# Patient Record
Sex: FEMALE | URBAN - METROPOLITAN AREA
[De-identification: names, ages, dates, MRNs, and addresses within clinical notes are randomized per-mention and may not be internally consistent; named-entity substitution may affect disease eponyms.]

---

## 2022-01-06 ENCOUNTER — APPOINTMENT (OUTPATIENT)
Dept: URBAN - METROPOLITAN AREA CLINIC 231 | Age: 62
Setting detail: DERMATOLOGY
End: 2022-01-06

## 2022-01-06 DIAGNOSIS — Z41.9 ENCOUNTER FOR PROCEDURE FOR PURPOSES OTHER THAN REMEDYING HEALTH STATE, UNSPECIFIED: ICD-10-CM

## 2022-01-06 PROCEDURE — OTHER CHEMICAL PEEL: OTHER

## 2022-01-06 PROCEDURE — OTHER COSMETIC CONSULTATION: RED SPOTS: OTHER

## 2022-01-06 PROCEDURE — OTHER COSMETIC CONSULTATION - RED SPOTS: OTHER

## 2022-01-06 ASSESSMENT — LOCATION ZONE DERM: LOCATION ZONE: FACE

## 2022-01-06 ASSESSMENT — LOCATION DETAILED DESCRIPTION DERM: LOCATION DETAILED: LEFT INFERIOR MEDIAL MALAR CHEEK

## 2022-01-06 ASSESSMENT — LOCATION SIMPLE DESCRIPTION DERM: LOCATION SIMPLE: LEFT CHEEK

## 2022-01-06 NOTE — PROCEDURE: CHEMICAL PEEL
Chemical Peel: Sal-Ex 20
Number Of Layers: 3
Post Peel Care: After the procedure, a post-peel cream was applied to the treated areas. Sun protection and post-care instructions were reviewed with the patient.
Price (Use Numbers Only, No Special Characters Or $): 400.00
Post-Care Instructions: I reviewed with the patient in detail post-care instructions. Patient should avoid sun exposure and wear sun protection.
Detail Level: Zone
Treatment Time (Optional): 5
Treatment Number: 1
Prep: The treated area was degreased with pre-peel cleanser.
Consent: Prior to the procedure, written consent was obtained and risks were reviewed, including but not limited to: redness, peeling, blistering, pigmentary change, scarring, infection, and pain.

## 2022-01-14 ENCOUNTER — APPOINTMENT (OUTPATIENT)
Dept: URBAN - METROPOLITAN AREA CLINIC 285 | Age: 62
Setting detail: DERMATOLOGY
End: 2022-01-14

## 2022-01-14 DIAGNOSIS — Z41.9 ENCOUNTER FOR PROCEDURE FOR PURPOSES OTHER THAN REMEDYING HEALTH STATE, UNSPECIFIED: ICD-10-CM

## 2022-01-14 PROCEDURE — OTHER COSMETIC QUOTE: OTHER

## 2022-01-14 NOTE — PROCEDURE: COSMETIC QUOTE
Botox Price Per Unit: 15
Notice: We have created a more complete Cosmetic Quote plan.  The procedure name is also Cosmetic Quote.  Please review the new plan and hide the Cosmetic Quote plan you do not want to use.
Discount Percentage: 0
Perlane Price Per Syringe: 500
Detail Level: Simple

## 2022-01-20 ENCOUNTER — APPOINTMENT (OUTPATIENT)
Dept: URBAN - METROPOLITAN AREA CLINIC 231 | Age: 62
Setting detail: DERMATOLOGY
End: 2022-01-20

## 2022-01-20 DIAGNOSIS — Z41.9 ENCOUNTER FOR PROCEDURE FOR PURPOSES OTHER THAN REMEDYING HEALTH STATE, UNSPECIFIED: ICD-10-CM

## 2022-01-20 PROCEDURE — OTHER LUMENIS M22: OTHER

## 2022-01-20 PROCEDURE — OTHER COSMETIC CONSULTATION: MICRONEEDLING: OTHER

## 2022-01-20 ASSESSMENT — LOCATION SIMPLE DESCRIPTION DERM: LOCATION SIMPLE: LEFT CHEEK

## 2022-01-20 ASSESSMENT — LOCATION DETAILED DESCRIPTION DERM: LOCATION DETAILED: LEFT INFERIOR CENTRAL MALAR CHEEK

## 2022-01-20 ASSESSMENT — LOCATION ZONE DERM: LOCATION ZONE: FACE

## 2022-01-20 NOTE — PROCEDURE: LUMENIS M22
Total Pulses: 109
Price (Use Numbers Only, No Special Characters Or $): 450.00
Number Of Passes: 1
Skin Type (Optional): II
External Cooling Fan Speed: 0
Fluence: 15
Pulse Duration (In Milliseconds): 4
Depth: medium
Anesthesia Type: 1% lidocaine with epinephrine
Pulse Delay (In Milliseconds): 20
Fluence Units: J/cm2
Treated Area: large area
Detail Level: Zone
Consent: Written consent obtained, risks reviewed including but not limited to crusting, scabbing, blistering, scarring, darker or lighter pigmentary change, bruising, and/or incomplete response.
Post-Care Instructions: I reviewed with the patient in detail post-care instructions. Patient should stay away from the sun and wear sun protection until treated areas are fully healed.
Procedure Text: The patient's skin was cleaned and the procedure was performed using the parameters noted above.

## 2022-01-28 ENCOUNTER — APPOINTMENT (OUTPATIENT)
Dept: URBAN - METROPOLITAN AREA CLINIC 231 | Age: 62
Setting detail: DERMATOLOGY
End: 2022-01-28

## 2022-01-28 DIAGNOSIS — Z41.9 ENCOUNTER FOR PROCEDURE FOR PURPOSES OTHER THAN REMEDYING HEALTH STATE, UNSPECIFIED: ICD-10-CM

## 2022-01-28 PROCEDURE — OTHER RF MICRONEEDLING: OTHER

## 2022-01-28 ASSESSMENT — LOCATION ZONE DERM: LOCATION ZONE: FACE

## 2022-01-28 ASSESSMENT — LOCATION SIMPLE DESCRIPTION DERM: LOCATION SIMPLE: LEFT CHEEK

## 2022-01-28 ASSESSMENT — LOCATION DETAILED DESCRIPTION DERM: LOCATION DETAILED: LEFT CENTRAL MALAR CHEEK

## 2022-01-28 NOTE — PROCEDURE: RF MICRONEEDLING
Location Override (Optional): upper lip/chin
Information: You must select either a Location or Location Override for the laser information to render in the note
Laser Override (Optional): Pixel-8
Depth In Mm: 1.5
Pulse Width In Msec (Optional): 300
Consent: Written consent obtained, risks reviewed including but not limited to pain, scarring, infection and incomplete improvement.  Patient understands the procedure is cosmetic in nature and will require out of pocket payment.
Rf Time In Msec (Optional): 190/250
Post-Care Instructions: After the procedure, take precautions agains sun exposure. Do not apply sunscreen for 12 hours after the procedure. Do not apply make-up for 12 hours after the procedure. Avoid alcohol based toners for 10-14 days. After 2-3 days patients can return to their regular skin regimen.
Level (Optional): 06
Indication: skin tightening and resurfacing
Depth In Mm: 1.7
Indication: enlarged pore size
Price (Use Numbers Only, No Special Characters Or $): 0712.49
Anesthesia Type: 1% lidocaine with epinephrine
Depth In Mm: 1.8
Treatment Number (Optional): 1
Laser: Elda Pixel8-
Pulse Count (Optional): 697
Depth In Mm: 1.1
Treatment Number (Optional): 0
Indication: moderate to severe periorbital rhytides
Location #1: Forehead
Location #2: Cheeks
Pre-Procedure Text: The treatment areas were cleansed in the usual fashion and treatment proceeded as outlined above.
Length Of Topical Anesthesia Application (Optional): 60 minutes
Detail Level: Simple
Location #3: Nose
Topical Anesthesia Type: BLT cream (benzocaine 20%, lidocaine 6%, tetracaine 4%)

## 2022-02-25 ENCOUNTER — APPOINTMENT (OUTPATIENT)
Dept: URBAN - METROPOLITAN AREA CLINIC 231 | Age: 62
Setting detail: DERMATOLOGY
End: 2022-02-25

## 2022-02-25 DIAGNOSIS — Z41.9 ENCOUNTER FOR PROCEDURE FOR PURPOSES OTHER THAN REMEDYING HEALTH STATE, UNSPECIFIED: ICD-10-CM

## 2022-02-25 PROCEDURE — OTHER RF MICRONEEDLING: OTHER

## 2022-02-25 ASSESSMENT — LOCATION SIMPLE DESCRIPTION DERM: LOCATION SIMPLE: LEFT CHEEK

## 2022-02-25 ASSESSMENT — LOCATION DETAILED DESCRIPTION DERM: LOCATION DETAILED: LEFT INFERIOR MEDIAL MALAR CHEEK

## 2022-02-25 ASSESSMENT — LOCATION ZONE DERM: LOCATION ZONE: FACE

## 2022-02-25 NOTE — HPI: COSMETIC (MICRONEEDLING)
Have You Had Microneedling Treatments Before?: has had a previous microneedling treatment
When Was Your Last Treatment?: 01/28/2022

## 2022-02-25 NOTE — PROCEDURE: RF MICRONEEDLING
Indication: moderate to severe perioral rhytides
Power In Goodman (Optional): 07
Level (Optional): 08
Location #2: Cheeks
Consent: Written consent obtained, risks reviewed including but not limited to pain, scarring, infection and incomplete improvement.  Patient understands the procedure is cosmetic in nature and will require out of pocket payment.
Laser: Elda Pixel8-
Pulse Width In Msec (Optional): 300
Treatment Number (Optional): 2
Depth In Mm: 1.7
Anesthesia Type: 1% lidocaine with epinephrine
Depth In Mm: 1.8
Pulse Count (Optional): 632
Information: You must select either a Location or Location Override for the laser information to render in the note
Indication: enlarged pore size
Depth In Mm: 1.2
Price (Use Numbers Only, No Special Characters Or $): 0.00
Laser Override (Optional): Pixel-8
Power In Goodman (Optional): 06
Location #4: Nose
Depth In Mm: 1.5
Post-Care Instructions: After the procedure, take precautions agains sun exposure. Do not apply sunscreen for 12 hours after the procedure. Do not apply make-up for 12 hours after the procedure. Avoid alcohol based toners for 10-14 days. After 2-3 days patients can return to their regular skin regimen.
Location #1: Forehead
Length Of Topical Anesthesia Application (Optional): 60 minutes
Indication: skin tightening and resurfacing
Location Override (Optional): upper lip/chin
Detail Level: Simple
Rf Time In Msec (Optional): 190/250
Topical Anesthesia Type: BLT cream (benzocaine 20%, lidocaine 6%, tetracaine 4%)
Treatment Number (Optional): 0
Pre-Procedure Text: The treatment areas were cleansed in the usual fashion and treatment proceeded as outlined above.

## 2022-03-18 ENCOUNTER — APPOINTMENT (OUTPATIENT)
Dept: URBAN - METROPOLITAN AREA CLINIC 231 | Age: 62
Setting detail: DERMATOLOGY
End: 2022-03-18

## 2022-03-18 DIAGNOSIS — Z41.9 ENCOUNTER FOR PROCEDURE FOR PURPOSES OTHER THAN REMEDYING HEALTH STATE, UNSPECIFIED: ICD-10-CM

## 2022-03-18 PROCEDURE — OTHER LUMENIS M22: OTHER

## 2022-03-18 ASSESSMENT — LOCATION DETAILED DESCRIPTION DERM: LOCATION DETAILED: LEFT INFERIOR CENTRAL MALAR CHEEK

## 2022-03-18 ASSESSMENT — LOCATION SIMPLE DESCRIPTION DERM: LOCATION SIMPLE: LEFT CHEEK

## 2022-03-18 ASSESSMENT — LOCATION ZONE DERM: LOCATION ZONE: FACE

## 2022-03-18 NOTE — PROCEDURE: LUMENIS M22
Post-Care Instructions: I reviewed with the patient in detail post-care instructions. Patient should stay away from the sun and wear sun protection until treated areas are fully healed.
Pulse Duration (In Milliseconds): 4
Consent: Written consent obtained, risks reviewed including but not limited to crusting, scabbing, blistering, scarring, darker or lighter pigmentary change, bruising, and/or incomplete response.
Fluence Units: J/cm2
Detail Level: Zone
Anesthesia Type: 1% lidocaine with epinephrine
Price (Use Numbers Only, No Special Characters Or $): 0.00
Number Of Passes: 1
Procedure Text: The patient's skin was cleaned and the procedure was performed using the parameters noted above.
Treated Area: large area
Fluence: 15
Skin Type (Optional): II
External Cooling Fan Speed: 0
Depth: medium
Treatment Number: 2
Total Pulses: 123
Pulse Delay (In Milliseconds): 20

## 2022-04-08 ENCOUNTER — APPOINTMENT (OUTPATIENT)
Dept: URBAN - METROPOLITAN AREA CLINIC 231 | Age: 62
Setting detail: DERMATOLOGY
End: 2022-04-08

## 2022-04-08 DIAGNOSIS — Z41.9 ENCOUNTER FOR PROCEDURE FOR PURPOSES OTHER THAN REMEDYING HEALTH STATE, UNSPECIFIED: ICD-10-CM

## 2022-04-08 PROCEDURE — OTHER CHEMICAL PEEL: OTHER

## 2022-04-08 ASSESSMENT — LOCATION DETAILED DESCRIPTION DERM: LOCATION DETAILED: LEFT MEDIAL MALAR CHEEK

## 2022-04-08 ASSESSMENT — LOCATION SIMPLE DESCRIPTION DERM: LOCATION SIMPLE: LEFT CHEEK

## 2022-04-08 ASSESSMENT — LOCATION ZONE DERM: LOCATION ZONE: FACE

## 2022-04-08 NOTE — PROCEDURE: CHEMICAL PEEL
Number Of Layers: 3
Treatment Time (Optional): 5
Post Peel Care: After the procedure, a post-peel cream was applied to the treated areas. Sun protection and post-care instructions were reviewed with the patient.
Price (Use Numbers Only, No Special Characters Or $): 0.00
Treatment Number: 2
Chemical Peel: Sal-Ex 30
Post-Care Instructions: I reviewed with the patient in detail post-care instructions. Patient should avoid sun exposure and wear sun protection.
Prep: The treated area was degreased with pre-peel cleanser.
Detail Level: Zone
Consent: Prior to the procedure, written consent was obtained and risks were reviewed, including but not limited to: redness, peeling, blistering, pigmentary change, scarring, infection, and pain.

## 2022-04-08 NOTE — HPI: COSMETIC (CHEMICAL PEEL)
Have You Had A Chemical Peel Before?: has had a previous peel
When Outside In The Sun, Do You...: mostly burns, rarely tans
When Was Your Last Peel?: 01/06/2022

## 2022-05-12 ENCOUNTER — APPOINTMENT (OUTPATIENT)
Dept: URBAN - METROPOLITAN AREA CLINIC 231 | Age: 62
Setting detail: DERMATOLOGY
End: 2022-05-13

## 2022-05-12 DIAGNOSIS — Z41.9 ENCOUNTER FOR PROCEDURE FOR PURPOSES OTHER THAN REMEDYING HEALTH STATE, UNSPECIFIED: ICD-10-CM

## 2022-05-12 PROCEDURE — OTHER CHEMICAL PEEL: OTHER

## 2022-05-12 ASSESSMENT — LOCATION SIMPLE DESCRIPTION DERM: LOCATION SIMPLE: LEFT CHEEK

## 2022-05-12 ASSESSMENT — LOCATION DETAILED DESCRIPTION DERM: LOCATION DETAILED: LEFT MEDIAL MALAR CHEEK

## 2022-05-12 ASSESSMENT — LOCATION ZONE DERM: LOCATION ZONE: FACE

## 2022-05-12 NOTE — PROCEDURE: CHEMICAL PEEL
Treatment Time (Optional): 5
Post-Care Instructions: I reviewed with the patient in detail post-care instructions. Patient should avoid sun exposure and wear sun protection.
Consent: Prior to the procedure, written consent was obtained and risks were reviewed, including but not limited to: redness, peeling, blistering, pigmentary change, scarring, infection, and pain.
Chemical Peel: Sal-Ex 20
Number Of Layers: 3
Post Peel Care: After the procedure, a post-peel cream was applied to the treated areas. Sun protection and post-care instructions were reviewed with the patient.
Prep: The treated area was degreased with pre-peel cleanser.
Price (Use Numbers Only, No Special Characters Or $): 0.00
Detail Level: Zone

## 2022-05-12 NOTE — HPI: COSMETIC (CHEMICAL PEEL)
Have You Had A Chemical Peel Before?: has had a previous peel
When Outside In The Sun, Do You...: mostly burns, rarely tans
When Was Your Last Peel?: 04/08/2022

## 2022-07-21 ENCOUNTER — APPOINTMENT (OUTPATIENT)
Dept: URBAN - METROPOLITAN AREA CLINIC 231 | Age: 62
Setting detail: DERMATOLOGY
End: 2022-07-21

## 2022-07-21 DIAGNOSIS — D18.0 HEMANGIOMA: ICD-10-CM

## 2022-07-21 DIAGNOSIS — L82.1 OTHER SEBORRHEIC KERATOSIS: ICD-10-CM

## 2022-07-21 DIAGNOSIS — D22 MELANOCYTIC NEVI: ICD-10-CM

## 2022-07-21 DIAGNOSIS — Z12.83 ENCOUNTER FOR SCREENING FOR MALIGNANT NEOPLASM OF SKIN: ICD-10-CM

## 2022-07-21 DIAGNOSIS — L81.4 OTHER MELANIN HYPERPIGMENTATION: ICD-10-CM

## 2022-07-21 PROBLEM — D18.01 HEMANGIOMA OF SKIN AND SUBCUTANEOUS TISSUE: Status: ACTIVE | Noted: 2022-07-21

## 2022-07-21 PROBLEM — D22.5 MELANOCYTIC NEVI OF TRUNK: Status: ACTIVE | Noted: 2022-07-21

## 2022-07-21 PROCEDURE — OTHER COUNSELING: OTHER

## 2022-07-21 PROCEDURE — OTHER MIPS QUALITY: OTHER

## 2022-07-21 PROCEDURE — 99213 OFFICE O/P EST LOW 20 MIN: CPT

## 2022-07-21 PROCEDURE — OTHER REASSURANCE: OTHER

## 2022-07-21 ASSESSMENT — LOCATION DETAILED DESCRIPTION DERM
LOCATION DETAILED: SUPERIOR THORACIC SPINE
LOCATION DETAILED: INFERIOR THORACIC SPINE

## 2022-07-21 ASSESSMENT — LOCATION ZONE DERM: LOCATION ZONE: TRUNK

## 2022-07-21 ASSESSMENT — LOCATION SIMPLE DESCRIPTION DERM: LOCATION SIMPLE: UPPER BACK

## 2023-06-15 ENCOUNTER — APPOINTMENT (OUTPATIENT)
Dept: URBAN - METROPOLITAN AREA CLINIC 231 | Age: 63
Setting detail: DERMATOLOGY
End: 2023-06-15

## 2023-06-15 DIAGNOSIS — Z41.9 ENCOUNTER FOR PROCEDURE FOR PURPOSES OTHER THAN REMEDYING HEALTH STATE, UNSPECIFIED: ICD-10-CM

## 2023-06-15 PROCEDURE — OTHER ADDITIONAL NOTES: OTHER

## 2023-06-15 PROCEDURE — OTHER FILLERS: OTHER

## 2023-06-15 ASSESSMENT — LOCATION DETAILED DESCRIPTION DERM
LOCATION DETAILED: RIGHT SUPERIOR LATERAL MALAR CHEEK
LOCATION DETAILED: LEFT INFERIOR LATERAL MALAR CHEEK
LOCATION DETAILED: LEFT CENTRAL MALAR CHEEK
LOCATION DETAILED: RIGHT INFERIOR LATERAL MALAR CHEEK
LOCATION DETAILED: LEFT SUPERIOR LATERAL MALAR CHEEK
LOCATION DETAILED: RIGHT CENTRAL MALAR CHEEK

## 2023-06-15 ASSESSMENT — LOCATION SIMPLE DESCRIPTION DERM
LOCATION SIMPLE: RIGHT CHEEK
LOCATION SIMPLE: LEFT CHEEK

## 2023-06-15 ASSESSMENT — LOCATION ZONE DERM: LOCATION ZONE: FACE

## 2023-06-15 NOTE — PROCEDURE: FILLERS
Include Cannula Information In Note?: No
Lateral Face Filler  Volume In Cc: 0
Consent: Written consent obtained. Risks include but not limited to bruising, beading, irregular texture, ulceration, infection, allergic reaction, scar formation, incomplete augmentation, temporary nature, procedural pain.
Detail Level: Detailed
Post-Care Instructions: Patient instructed to apply ice to reduce swelling.
Number Of Syringes (Required For Inventory): 1
Show Inventory Tab: Show
Filler: RHA 4
Include Cannula Information In Note?: Yes
Include Cannula Brand?: DermaSculpt
Map Statment: See Attach Map for Complete Details
Anesthesia Type: 1% lidocaine with epinephrine
Include Cannula Size?: 25G
Anesthesia Volume In Cc: 0.5
Filler: RHA 3
Include Cannula Length?: 1.5 inch
Additional Anesthesia Volume In Cc: 6
Topical Anesthesia?: BLT cream (benzocaine 8%, lidocaine 5%, tetracaine 4%)
Inventory Information: This plan will send filler information to inventory based on the fillers you select. Multiple fillers can be sent but you must ensure you select the appropriate fillers in the inventory tab.

## 2023-06-29 ENCOUNTER — APPOINTMENT (OUTPATIENT)
Dept: URBAN - METROPOLITAN AREA CLINIC 231 | Age: 63
Setting detail: DERMATOLOGY
End: 2023-06-29

## 2023-06-29 DIAGNOSIS — Z41.9 ENCOUNTER FOR PROCEDURE FOR PURPOSES OTHER THAN REMEDYING HEALTH STATE, UNSPECIFIED: ICD-10-CM

## 2023-06-29 PROCEDURE — OTHER FILLERS: OTHER

## 2023-06-29 ASSESSMENT — LOCATION ZONE DERM: LOCATION ZONE: FACE

## 2023-06-29 ASSESSMENT — LOCATION DETAILED DESCRIPTION DERM
LOCATION DETAILED: LEFT INFERIOR MEDIAL MALAR CHEEK
LOCATION DETAILED: RIGHT INFERIOR MEDIAL MALAR CHEEK

## 2023-06-29 ASSESSMENT — LOCATION SIMPLE DESCRIPTION DERM
LOCATION SIMPLE: RIGHT CHEEK
LOCATION SIMPLE: LEFT CHEEK

## 2023-06-29 NOTE — PROCEDURE: FILLERS
Nasolabial Folds Filler Volume In Cc: 0
Detail Level: Detailed
Include Cannula Information In Note?: No
Filler: Versa
Show Inventory Tab: Show
Number Of Syringes (Required For Inventory): 1
Map Statment: See Attach Map for Complete Details
Consent: Written consent obtained. Risks include but not limited to bruising, beading, irregular texture, ulceration, infection, allergic reaction, scar formation, incomplete augmentation, temporary nature, procedural pain.
Nasolabial Folds Filler Volume In Cc: 1.2
Post-Care Instructions: Patient instructed to apply ice to reduce swelling.
Anesthesia Type: 1% lidocaine with epinephrine
Anesthesia Volume In Cc: 0.5
Additional Anesthesia Volume In Cc: 6
Inventory Information: This plan will send filler information to inventory based on the fillers you select. Multiple fillers can be sent but you must ensure you select the appropriate fillers in the inventory tab.

## 2023-07-26 ENCOUNTER — APPOINTMENT (OUTPATIENT)
Dept: URBAN - METROPOLITAN AREA CLINIC 231 | Age: 63
Setting detail: DERMATOLOGY
End: 2023-07-26

## 2023-08-03 ENCOUNTER — APPOINTMENT (OUTPATIENT)
Dept: URBAN - METROPOLITAN AREA CLINIC 231 | Age: 63
Setting detail: DERMATOLOGY
End: 2023-08-03

## 2023-08-03 DIAGNOSIS — Z41.9 ENCOUNTER FOR PROCEDURE FOR PURPOSES OTHER THAN REMEDYING HEALTH STATE, UNSPECIFIED: ICD-10-CM

## 2023-08-03 PROCEDURE — OTHER ADDITIONAL NOTES: OTHER

## 2023-08-03 PROCEDURE — OTHER FILLERS: OTHER

## 2023-08-03 ASSESSMENT — LOCATION DETAILED DESCRIPTION DERM
LOCATION DETAILED: LEFT UPPER CUTANEOUS LIP
LOCATION DETAILED: RIGHT UPPER CUTANEOUS LIP
LOCATION DETAILED: RIGHT LOWER CUTANEOUS LIP
LOCATION DETAILED: LEFT SUPERIOR VERMILION LIP
LOCATION DETAILED: RIGHT INFERIOR VERMILION LIP
LOCATION DETAILED: LEFT LOWER CUTANEOUS LIP

## 2023-08-03 ASSESSMENT — LOCATION SIMPLE DESCRIPTION DERM
LOCATION SIMPLE: LEFT LIP
LOCATION SIMPLE: RIGHT LIP

## 2023-08-03 ASSESSMENT — LOCATION ZONE DERM: LOCATION ZONE: LIP

## 2023-08-03 NOTE — PROCEDURE: FILLERS
Additional Area 2 Volume In Cc: 0
Additional Anesthesia Volume In Cc: 6
Number Of Syringes (Required For Inventory): 1
Include Cannula Information In Note?: No
Filler: Juvederm Volbella XC
Inventory Information: This plan will send filler information to inventory based on the fillers you select. Multiple fillers can be sent but you must ensure you select the appropriate fillers in the inventory tab.
Detail Level: Detailed
Post-Care Instructions: Patient instructed to apply ice to reduce swelling.
Consent: Written consent obtained. Risks include but not limited to bruising, beading, irregular texture, ulceration, infection, allergic reaction, scar formation, incomplete augmentation, temporary nature, procedural pain.
Show Inventory Tab: Show
Map Statment: See Attach Map for Complete Details
Anesthesia Type: 1% lidocaine with epinephrine
Anesthesia Volume In Cc: 0.5

## 2023-08-03 NOTE — PROCEDURE: ADDITIONAL NOTES
Additional Notes: August filler special
Detail Level: Simple
Render Risk Assessment In Note?: no
Additional Notes: Discussed doing the chin next time for filler.

## 2023-11-22 ENCOUNTER — APPOINTMENT (OUTPATIENT)
Dept: URBAN - METROPOLITAN AREA CLINIC 231 | Age: 63
Setting detail: DERMATOLOGY
End: 2023-11-22

## 2023-11-22 DIAGNOSIS — L57.8 OTHER SKIN CHANGES DUE TO CHRONIC EXPOSURE TO NONIONIZING RADIATION: ICD-10-CM

## 2023-11-22 DIAGNOSIS — Z12.83 ENCOUNTER FOR SCREENING FOR MALIGNANT NEOPLASM OF SKIN: ICD-10-CM

## 2023-11-22 DIAGNOSIS — L20.89 OTHER ATOPIC DERMATITIS: ICD-10-CM

## 2023-11-22 PROBLEM — D23.5 OTHER BENIGN NEOPLASM OF SKIN OF TRUNK: Status: ACTIVE | Noted: 2023-11-22

## 2023-11-22 PROCEDURE — OTHER PRESCRIPTION: OTHER

## 2023-11-22 PROCEDURE — 99213 OFFICE O/P EST LOW 20 MIN: CPT

## 2023-11-22 PROCEDURE — OTHER MIPS QUALITY: OTHER

## 2023-11-22 PROCEDURE — OTHER TREATMENT REGIMEN: OTHER

## 2023-11-22 PROCEDURE — OTHER COUNSELING: OTHER

## 2023-11-22 RX ORDER — HYDROCORTISONE 25 MG/G
CREAM TOPICAL
Qty: 30 | Refills: 0 | Status: ERX | COMMUNITY
Start: 2023-11-22

## 2023-11-22 ASSESSMENT — LOCATION SIMPLE DESCRIPTION DERM
LOCATION SIMPLE: NOSE
LOCATION SIMPLE: RIGHT FOREHEAD

## 2023-11-22 ASSESSMENT — LOCATION ZONE DERM
LOCATION ZONE: NOSE
LOCATION ZONE: FACE

## 2023-11-22 ASSESSMENT — LOCATION DETAILED DESCRIPTION DERM
LOCATION DETAILED: NASAL TIP
LOCATION DETAILED: RIGHT INFERIOR MEDIAL FOREHEAD

## 2023-11-22 NOTE — PROCEDURE: TREATMENT REGIMEN
Detail Level: Zone
Initiate Treatment: Print\\nhydrocortisone 2.5 % topical cream	Apply to the affected areas on the nose once a day for up too two weeks then discontinue
Otc Regimen: Vanicream

## 2024-02-14 ENCOUNTER — APPOINTMENT (OUTPATIENT)
Dept: URBAN - METROPOLITAN AREA CLINIC 231 | Age: 64
Setting detail: DERMATOLOGY
End: 2024-02-15

## 2024-02-14 DIAGNOSIS — L71.8 OTHER ROSACEA: ICD-10-CM

## 2024-02-14 DIAGNOSIS — Z41.9 ENCOUNTER FOR PROCEDURE FOR PURPOSES OTHER THAN REMEDYING HEALTH STATE, UNSPECIFIED: ICD-10-CM

## 2024-02-14 PROCEDURE — OTHER COSMETIC CONSULTATION: BOTOX: OTHER

## 2024-02-14 PROCEDURE — OTHER PRESCRIPTION: OTHER

## 2024-02-14 PROCEDURE — OTHER TREATMENT REGIMEN: OTHER

## 2024-02-14 PROCEDURE — OTHER FILLERS: OTHER

## 2024-02-14 PROCEDURE — OTHER COUNSELING: OTHER

## 2024-02-14 PROCEDURE — 99213 OFFICE O/P EST LOW 20 MIN: CPT

## 2024-02-14 PROCEDURE — OTHER BOTOX: OTHER

## 2024-02-14 RX ORDER — METRONIDAZOLE 7.5 MG/G
CREAM TOPICAL
Qty: 45 | Refills: 2 | Status: ERX | COMMUNITY
Start: 2024-02-14

## 2024-02-14 ASSESSMENT — LOCATION SIMPLE DESCRIPTION DERM
LOCATION SIMPLE: LEFT FOREHEAD
LOCATION SIMPLE: LEFT CHEEK
LOCATION SIMPLE: LEFT CHEEK
LOCATION SIMPLE: RIGHT LIP
LOCATION SIMPLE: RIGHT CHEEK
LOCATION SIMPLE: GLABELLA
LOCATION SIMPLE: RIGHT EYEBROW
LOCATION SIMPLE: LEFT LIP

## 2024-02-14 ASSESSMENT — LOCATION DETAILED DESCRIPTION DERM
LOCATION DETAILED: LEFT CENTRAL BUCCAL CHEEK
LOCATION DETAILED: RIGHT LOWER CUTANEOUS LIP
LOCATION DETAILED: LEFT INFERIOR MEDIAL FOREHEAD
LOCATION DETAILED: RIGHT CENTRAL EYEBROW
LOCATION DETAILED: LEFT INFERIOR CENTRAL MALAR CHEEK
LOCATION DETAILED: LEFT LOWER CUTANEOUS LIP
LOCATION DETAILED: RIGHT CENTRAL BUCCAL CHEEK
LOCATION DETAILED: GLABELLA

## 2024-02-14 ASSESSMENT — LOCATION ZONE DERM
LOCATION ZONE: FACE
LOCATION ZONE: FACE
LOCATION ZONE: LIP

## 2024-02-14 NOTE — PROCEDURE: FILLERS
Tear Troughs Filler Volume In Cc: 0
Additional Anesthesia Volume In Cc: 6
Number Of Syringes (Required For Inventory): 1
Additional Area 1 Location: [facility]
Include Cannula Size?: 25G
Include Cannula Information In Note?: No
Consent: Written consent obtained. Risks include but not limited to bruising, beading, irregular texture, ulceration, infection, allergic reaction, scar formation, incomplete augmentation, temporary nature, procedural pain.
Post-Care Instructions: Patient instructed to apply ice to reduce swelling.
Filler: Juvederm Ultra Plus XC
Include Cannula Length?: 1.5 inch
Map Statment: See Attach Map for Complete Details
Anesthesia Type: 1% lidocaine with epinephrine
Inventory Information: This plan will send filler information to inventory based on the fillers you select. Multiple fillers can be sent but you must ensure you select the appropriate fillers in the inventory tab.
Detail Level: Detailed
Anesthesia Volume In Cc: 0.5
Include Cannula Information In Note?: Yes
Include Cannula Brand?: DermaSculpt
Show Inventory Tab: Show

## 2024-02-14 NOTE — PROCEDURE: BOTOX
Additional Area 6 Units: 0
Show Lcl Units: No
Show Glabellar Units: Yes
Consent: Written consent obtained. Risks include but not limited to lid/brow ptosis, bruising, swelling, diplopia, temporary effect, incomplete chemical denervation.
Post-Care Instructions: Patient instructed to not lie down for 4 hours and limit physical activity for 24 hours.
Incrementing Botox Units: By 0.5 Units
Dilution (U/0.1 Cc): 4
Detail Level: Detailed
Glabellar Complex Units: 30
Show Inventory Tab: Show

## 2024-02-14 NOTE — PROCEDURE: TREATMENT REGIMEN
Detail Level: Zone
Otc Regimen: Neutrogena Hydroboost cleanser\\nVanicream HA moisturizing cream
Initiate Treatment: metronidazole 0.75 % topical cream	Apply a thin layer to central face twice daily.

## 2024-06-26 ENCOUNTER — APPOINTMENT (OUTPATIENT)
Dept: URBAN - METROPOLITAN AREA CLINIC 231 | Age: 64
Setting detail: DERMATOLOGY
End: 2024-06-26

## 2024-06-26 DIAGNOSIS — Z41.9 ENCOUNTER FOR PROCEDURE FOR PURPOSES OTHER THAN REMEDYING HEALTH STATE, UNSPECIFIED: ICD-10-CM

## 2024-06-26 PROCEDURE — OTHER ADDITIONAL NOTES: OTHER

## 2024-06-26 PROCEDURE — OTHER FILLERS: OTHER

## 2024-06-26 PROCEDURE — OTHER BOTOX: OTHER

## 2024-06-26 ASSESSMENT — LOCATION SIMPLE DESCRIPTION DERM
LOCATION SIMPLE: RIGHT FOREHEAD
LOCATION SIMPLE: LEFT CHEEK
LOCATION SIMPLE: GLABELLA
LOCATION SIMPLE: RIGHT EYEBROW
LOCATION SIMPLE: CHIN
LOCATION SIMPLE: RIGHT CHEEK
LOCATION SIMPLE: LEFT FOREHEAD

## 2024-06-26 ASSESSMENT — LOCATION DETAILED DESCRIPTION DERM
LOCATION DETAILED: LEFT INFERIOR CENTRAL MALAR CHEEK
LOCATION DETAILED: RIGHT INFERIOR CENTRAL MALAR CHEEK
LOCATION DETAILED: RIGHT INFERIOR FOREHEAD
LOCATION DETAILED: RIGHT MEDIAL EYEBROW
LOCATION DETAILED: GLABELLA
LOCATION DETAILED: LEFT INFERIOR MEDIAL FOREHEAD
LOCATION DETAILED: LEFT LATERAL MALAR CHEEK
LOCATION DETAILED: LEFT CHIN
LOCATION DETAILED: RIGHT CHIN
LOCATION DETAILED: RIGHT CENTRAL MALAR CHEEK

## 2024-06-26 ASSESSMENT — LOCATION ZONE DERM: LOCATION ZONE: FACE

## 2024-06-26 NOTE — PROCEDURE: BOTOX
R Brow Units: 0
Show Lateral Platysmal Band Units: Yes
Show Ucl Units: No
Consent: Written consent obtained. Risks include but not limited to lid/brow ptosis, bruising, swelling, diplopia, temporary effect, incomplete chemical denervation.
Additional Area 1 Location: chin
Dilution (U/0.1 Cc): 4
Post-Care Instructions: Patient instructed to not lie down for 4 hours and limit physical activity for 24 hours.
Additional Area 1 Units: 6
Incrementing Botox Units: By 0.5 Units
Detail Level: Detailed
Glabellar Complex Units: 30
Show Inventory Tab: Show

## 2024-06-26 NOTE — PROCEDURE: FILLERS
Decollete Filler Volume In Cc: 0
Anesthesia Type: 1% lidocaine with epinephrine
Number Of Syringes (Required For Inventory): 1
Anesthesia Volume In Cc: 0.5
Include Cannula Information In Note?: No
Additional Anesthesia Volume In Cc: 6
Inventory Information: This plan will send filler information to inventory based on the fillers you select. Multiple fillers can be sent but you must ensure you select the appropriate fillers in the inventory tab.
Additional Area 1 Location: chin
Detail Level: Detailed
Additional Area 1 Volume In Cc: 0.2
Show Inventory Tab: Show
Filler: Juvederm Ultra Plus XC
Consent: Written consent obtained. Risks include but not limited to bruising, beading, irregular texture, ulceration, infection, allergic reaction, scar formation, incomplete augmentation, temporary nature, procedural pain.
Cheeks Filler Volume In Cc: 0.8
Post-Care Instructions: Patient instructed to apply ice to reduce swelling.
Map Statment: See Attach Map for Complete Details

## 2024-11-19 NOTE — PROCEDURE: ADDITIONAL NOTES
Additional Notes: Discussed 1 syringe versa for n/l folds and chin. \\n35 units of Botox (30 u to glabella and 5 u for eyebrow lift)\\n1 syringe volbella to vermillion of lips at separate future appointment. Patient requests to be done in stages.
Render Risk Assessment In Note?: no
Detail Level: Simple
Calm

## 2025-03-12 ENCOUNTER — APPOINTMENT (OUTPATIENT)
Dept: URBAN - METROPOLITAN AREA CLINIC 231 | Age: 65
Setting detail: DERMATOLOGY
End: 2025-03-13

## 2025-03-12 DIAGNOSIS — Z41.9 ENCOUNTER FOR PROCEDURE FOR PURPOSES OTHER THAN REMEDYING HEALTH STATE, UNSPECIFIED: ICD-10-CM

## 2025-03-12 PROCEDURE — OTHER ADDITIONAL NOTES: OTHER

## 2025-03-12 PROCEDURE — OTHER FILLERS: OTHER

## 2025-03-12 ASSESSMENT — LOCATION ZONE DERM: LOCATION ZONE: FACE

## 2025-03-12 ASSESSMENT — LOCATION DETAILED DESCRIPTION DERM
LOCATION DETAILED: RIGHT CENTRAL MALAR CHEEK
LOCATION DETAILED: RIGHT INFERIOR CENTRAL MALAR CHEEK
LOCATION DETAILED: LEFT CHIN
LOCATION DETAILED: LEFT INFERIOR CENTRAL MALAR CHEEK
LOCATION DETAILED: LEFT LATERAL MALAR CHEEK

## 2025-03-12 ASSESSMENT — LOCATION SIMPLE DESCRIPTION DERM
LOCATION SIMPLE: CHIN
LOCATION SIMPLE: LEFT CHEEK
LOCATION SIMPLE: RIGHT CHEEK

## 2025-03-12 NOTE — PROCEDURE: ADDITIONAL NOTES
Additional Notes: Consider skinvive sample for chin and perioral region at time of Volbella. 
Detail Level: Simple
Render Risk Assessment In Note?: no

## 2025-04-16 ENCOUNTER — APPOINTMENT (OUTPATIENT)
Dept: URBAN - METROPOLITAN AREA CLINIC 231 | Age: 65
Setting detail: DERMATOLOGY
End: 2025-04-17

## 2025-04-16 DIAGNOSIS — Z41.9 ENCOUNTER FOR PROCEDURE FOR PURPOSES OTHER THAN REMEDYING HEALTH STATE, UNSPECIFIED: ICD-10-CM

## 2025-04-16 PROCEDURE — OTHER CHEMICAL PEEL: OTHER

## 2025-04-16 ASSESSMENT — LOCATION ZONE DERM: LOCATION ZONE: FACE

## 2025-04-16 ASSESSMENT — LOCATION SIMPLE DESCRIPTION DERM: LOCATION SIMPLE: LEFT CHEEK

## 2025-04-16 ASSESSMENT — LOCATION DETAILED DESCRIPTION DERM: LOCATION DETAILED: LEFT MEDIAL MALAR CHEEK

## 2025-04-16 NOTE — HPI: COSMETIC (CHEMICAL PEEL)
previous_has_had_a_previous_peel
When Outside In The Sun, Do You...: mostly burns, rarely tans
When Was Your Last Peel?: 05/12/2022

## 2025-04-16 NOTE — PROCEDURE: CHEMICAL PEEL
Price (Use Numbers Only, No Special Characters Or $): 0.00
Post Peel Care: After the procedure, Vitamin C/E ferulic serum, EltaMD SPF 46 was applied to the treated areas and post-care instructions were reviewed with the patient.
Detail Level: Zone
Consent: Prior to the procedure, written consent was obtained and risks were reviewed, including but not limited to: redness, peeling, blistering, pigmentary change, scarring, infection, and pain.
Number Of Layers: 3
Chemical Peel: GA 20%
Treatment Time (Optional): 5 minutes
Treatment Number: 0
Post-Care Instructions: I reviewed with the patient in detail post-care instructions. Patient should avoid sun exposure and wear sun protection.
Prep: The treated area was degreased with pre-peel cleanser, and vaseline was applied for protection of mucous membranes.

## 2025-08-13 ENCOUNTER — APPOINTMENT (OUTPATIENT)
Dept: URBAN - METROPOLITAN AREA CLINIC 231 | Age: 65
Setting detail: DERMATOLOGY
End: 2025-08-14

## 2025-08-13 DIAGNOSIS — L92.3 FOREIGN BODY GRANULOMA OF THE SKIN AND SUBCUTANEOUS TISSUE: ICD-10-CM

## 2025-08-13 DIAGNOSIS — D22 MELANOCYTIC NEVI: ICD-10-CM

## 2025-08-13 DIAGNOSIS — L57.8 OTHER SKIN CHANGES DUE TO CHRONIC EXPOSURE TO NONIONIZING RADIATION: ICD-10-CM

## 2025-08-13 DIAGNOSIS — L82.1 OTHER SEBORRHEIC KERATOSIS: ICD-10-CM

## 2025-08-13 DIAGNOSIS — D18.0 HEMANGIOMA: ICD-10-CM

## 2025-08-13 DIAGNOSIS — Z12.83 ENCOUNTER FOR SCREENING FOR MALIGNANT NEOPLASM OF SKIN: ICD-10-CM

## 2025-08-13 PROBLEM — D22.5 MELANOCYTIC NEVI OF TRUNK: Status: ACTIVE | Noted: 2025-08-13

## 2025-08-13 PROBLEM — D23.71 OTHER BENIGN NEOPLASM OF SKIN OF RIGHT LOWER LIMB, INCLUDING HIP: Status: ACTIVE | Noted: 2025-08-13

## 2025-08-13 PROBLEM — D18.01 HEMANGIOMA OF SKIN AND SUBCUTANEOUS TISSUE: Status: ACTIVE | Noted: 2025-08-13

## 2025-08-13 PROCEDURE — OTHER ADDITIONAL NOTES: OTHER

## 2025-08-13 PROCEDURE — OTHER COUNSELING: OTHER

## 2025-08-13 PROCEDURE — 99213 OFFICE O/P EST LOW 20 MIN: CPT

## 2025-08-13 ASSESSMENT — LOCATION ZONE DERM
LOCATION ZONE: TRUNK
LOCATION ZONE: FACE
LOCATION ZONE: FINGER

## 2025-08-13 ASSESSMENT — LOCATION SIMPLE DESCRIPTION DERM
LOCATION SIMPLE: RIGHT FOREHEAD
LOCATION SIMPLE: RIGHT UPPER BACK
LOCATION SIMPLE: RIGHT INDEX FINGER
LOCATION SIMPLE: LEFT UPPER BACK

## 2025-08-13 ASSESSMENT — LOCATION DETAILED DESCRIPTION DERM
LOCATION DETAILED: RIGHT INFERIOR MEDIAL FOREHEAD
LOCATION DETAILED: RIGHT MID-UPPER BACK
LOCATION DETAILED: RIGHT INDEX PROXIMAL INTERPHALANGEAL JOINT
LOCATION DETAILED: LEFT MEDIAL UPPER BACK
LOCATION DETAILED: LEFT SUPERIOR MEDIAL UPPER BACK